# Patient Record
Sex: FEMALE | Race: BLACK OR AFRICAN AMERICAN | Employment: FULL TIME | URBAN - METROPOLITAN AREA
[De-identification: names, ages, dates, MRNs, and addresses within clinical notes are randomized per-mention and may not be internally consistent; named-entity substitution may affect disease eponyms.]

---

## 2021-08-24 ENCOUNTER — APPOINTMENT (OUTPATIENT)
Dept: GENERAL RADIOLOGY | Age: 41
End: 2021-08-24
Attending: PHYSICIAN ASSISTANT
Payer: OTHER GOVERNMENT

## 2021-08-24 ENCOUNTER — HOSPITAL ENCOUNTER (EMERGENCY)
Age: 41
Discharge: HOME | End: 2021-08-24
Attending: STUDENT IN AN ORGANIZED HEALTH CARE EDUCATION/TRAINING PROGRAM
Payer: OTHER GOVERNMENT

## 2021-08-24 VITALS
RESPIRATION RATE: 20 BRPM | SYSTOLIC BLOOD PRESSURE: 122 MMHG | TEMPERATURE: 98.3 F | BODY MASS INDEX: 41.95 KG/M2 | DIASTOLIC BLOOD PRESSURE: 81 MMHG | WEIGHT: 293 LBS | OXYGEN SATURATION: 98 % | HEART RATE: 115 BPM | HEIGHT: 70 IN

## 2021-08-24 DIAGNOSIS — U07.1 COVID-19: Primary | ICD-10-CM

## 2021-08-24 PROCEDURE — 71046 X-RAY EXAM CHEST 2 VIEWS: CPT

## 2021-08-24 PROCEDURE — 74011250636 HC RX REV CODE- 250/636: Performed by: STUDENT IN AN ORGANIZED HEALTH CARE EDUCATION/TRAINING PROGRAM

## 2021-08-24 PROCEDURE — 99282 EMERGENCY DEPT VISIT SF MDM: CPT

## 2021-08-24 RX ORDER — DEXAMETHASONE 4 MG/1
10 TABLET ORAL EVERY 12 HOURS
Status: DISCONTINUED | OUTPATIENT
Start: 2021-08-24 | End: 2021-08-24 | Stop reason: HOSPADM

## 2021-08-24 RX ADMIN — DEXAMETHASONE 10 MG: 4 TABLET ORAL at 14:46

## 2021-08-24 NOTE — Clinical Note
[facility]  [facility] EMERGENCY DEPT  [id number] [address] [phone]    Work/School Note    Date: 8/24/2021     To Whom It May concern:    Desiree Espinoza was evaulated by the following provider(s):  Attending Provider: Lucas Mason MD.   COVID19 virus is suspected. Per the CDC guidelines we recommend home isolation until the following conditions are all met:    1. At least 10 days have passed since symptoms first appeared and  2. At least 24 hours have passed since last fever without the use of fever-reducing medications and  3.  Symptoms (e.g., cough, shortness of breath) have improved    Sincerely,          Ifeanyi Knight MD

## 2021-08-24 NOTE — ED TRIAGE NOTES
Patient reports tested positive for covid on 08/16. Has been seen at [facility]. Wants a second opinion and evaluation regarding her care.

## 2021-08-25 ENCOUNTER — PATIENT OUTREACH (OUTPATIENT)
Dept: CASE MANAGEMENT | Age: 41
End: 2021-08-25

## 2021-08-25 ENCOUNTER — TELEPHONE (OUTPATIENT)
Dept: FAMILY MEDICINE CLINIC | Facility: CLINIC | Age: 41
End: 2021-08-25

## 2021-08-25 NOTE — PROGRESS NOTES
Patient contacted regarding COVID-19 diagnosis. Discussed COVID-19 related testing which was available at this time. Test results were positive. Patient informed of results, if available? Patient was informed of test results x3. Urgent Care on 21 was positive. [facility] ED on 21 was positive and [facility] ED on 21 was positive. Ambulatory Care Manager contacted the patient by telephone to perform post discharge assessment. Call within 2 business days of discharge: Yes Verified name and  with patient as identifiers. Provided introduction to self, and explanation of the CTN/ACM role, and reason for call due to risk factors for infection and/or exposure to COVID-19. Symptoms reviewed with patient who verbalized the following symptoms: cough, shortness of breath and nasal congestion      Due to no new or worsening symptoms encounter was routed to provider for escalation. Discussed follow-up appointments. If no appointment was previously scheduled, appointment scheduling offered:  Yes. Patient does not have a PCP and is without insurance at this time. Is agreeable to being seen by [facility] physician. Ross Key RN with [facility] contacted. [facility] follow up appointment(s): No future appointments. Non-[facility] follow up appointment(s): NA    Interventions to address risk factors: Obtained and reviewed discharge summary and/or continuity of care documents     Advance Care Planning:   Does patient have an Advance Directive: not on file. Educated patient about risk for severe COVID-19 due to risk factors according to CDC guidelines. ACM reviewed discharge instructions, medical action plan and red flag symptoms with the patient who verbalized understanding. Discussed COVID vaccination status: yes. Education provided on COVID-19 vaccination as appropriate. Discussed exposure protocols and quarantine with CDC Guidelines.  Patient was given an opportunity to verbalize any questions and concerns and agrees to contact ACM or health care provider for questions related to their healthcare. Reviewed and educated patient on any new and changed medications related to discharge diagnosis  No medications ordered. Was patient discharged with a pulse oximeter? no Discussed and confirmed pulse oximeter discharge instructions and when to notify provider or seek emergency care. Patient has not been vaccinated but plans to be vaccinated after recovery from COVID-19. Reports that she lives with boyfriend and he also has tested positive, both are self quarantined. ACM provided contact information. Plan to follow up in 14 days based on severity of symptoms and risk factors.

## 2021-08-25 NOTE — TELEPHONE ENCOUNTER
Attempted to reach out to patient to scheduled an telemedicine visit. Left patient a voicemail explaining the services the care [facility] offer and to give the office a call back.

## 2021-08-26 ENCOUNTER — VIRTUAL VISIT (OUTPATIENT)
Dept: FAMILY MEDICINE CLINIC | Facility: CLINIC | Age: 41
End: 2021-08-26

## 2021-08-26 DIAGNOSIS — J12.82 PNEUMONIA DUE TO COVID-19 VIRUS: Primary | ICD-10-CM

## 2021-08-26 DIAGNOSIS — U07.1 PNEUMONIA DUE TO COVID-19 VIRUS: Primary | ICD-10-CM

## 2021-08-26 PROCEDURE — 99441 PR PHYS/QHP TELEPHONE EVALUATION 5-10 MIN: CPT | Performed by: FAMILY MEDICINE

## 2021-08-26 NOTE — ED PROVIDER NOTES
HPI   Patient is a 38-year female who presents emergency department requesting a second opinion regarding her COVID-19 diagnosis. Patient states that she has been having symptoms since the 14th of this month. She was diagnosed with Covid on Monday after she noted progressive worsening cough and shortness of breath. While the hospital she had blood work done and then was sent to infusion clinic to get \"antibiotics \". She states that she was supposed to have this appointment today however when she arrived they told her that she is been having symptoms too long that she was not a candidate. This upset her and she would like something to help treat her Covid infection. States that currently she is still having shortness of breath. This is exertional in nature. It is associated with a nonproductive cough. She denies any associated fever, sweats or chills, runny nose or sore throat. She is not having any nausea vomiting or diarrhea. She denies any associated chest pain. Her breathing is worse with exertion. Improves with rest.  She denies any history of asthma or other lung issues. She does not feel like her symptoms have worsened. No past medical history on file. No past surgical history on file. No family history on file.     Social History     Socioeconomic History    Marital status: SINGLE     Spouse name: Not on file    Number of children: Not on file    Years of education: Not on file    Highest education level: Not on file   Occupational History    Not on file   Tobacco Use    Smoking status: Not on file   Substance and Sexual Activity    Alcohol use: Not on file    Drug use: Not on file    Sexual activity: Not on file   Other Topics Concern    Not on file   Social History Narrative    Not on file     Social Determinants of Health     Financial Resource Strain:     Difficulty of Paying Living Expenses:    Food Insecurity:     Worried About Running Out of Food in the Last Year:  Ran Out of Food in the Last Year:    Transportation Needs:     Lack of Transportation (Medical):  Lack of Transportation (Non-Medical):    Physical Activity:     Days of Exercise per Week:     Minutes of Exercise per Session:    Stress:     Feeling of Stress :    Social Connections:     Frequency of Communication with Friends and Family:     Frequency of Social Gatherings with Friends and Family:     Attends Mosque Services:     Active Member of Clubs or Organizations:     Attends Club or Organization Meetings:     Marital Status:    Intimate Partner Violence:     Fear of Current or Ex-Partner:     Emotionally Abused:     Physically Abused:     Sexually Abused: ALLERGIES: Patient has no known allergies. Review of Systems  Constitutional: No fever  HENT: No ear pain  Eyes: No change in vision  Respiratory: Positive shortness of breath  Cardio: No chest pain  GI: No blood in stool  : No hematuria  MSK: No back pain  Skin: No rashes  Neuro: No headache    Vitals:    08/24/21 1223   BP: 122/81   Pulse: (!) 115   Resp: 20   Temp: 98.3 °F (36.8 °C)   SpO2: 98%   Weight: 137.4 kg (303 lb)   Height: 5' 10\" (1.778 m)            Physical Exam   General: No acute distress  Head: Normocephalic, atraumatic  Psych: Cooperative and alert  Eyes: No scleral icterus, normal conjunctiva  ENT: Moist oral mucosa  Neck: Supple  CV: Tachycardic with regular rhythm, no pitting edema, palpable radial pulses  Pulm: Clear breath sounds bilaterally without any wheezing or rhonchi, normal respiratory rate  GI: Normal bowel sounds, soft, non-tender  MSK: Moves all four extremities  Skin: No rashes  Neuro: Alert and conversive    MDM   Patient is a 21-year-old female who presents with request for second opinion for COVID-19. Patient is noted to be tachycardic but is saturating appropriately. She does get dyspneic with exertion but this slowly resolves.   I did a obtain a ambulatory pulse ox on her which did dip down to approximately 94-95% but never got any lower and quickly improved with rest.  Review of the EMR shows that she had a full work-up done yesterday at the outside hospital including EKG, troponin and D-dimer. Her D-dimer at that time was 0.4. She has not had any new or worsening symptoms since that time. She does state that she was told that she needs antibiotics but did not get them because this I would like to repeat a chest x-ray as I cannot see the one from her outside hospital to evaluate for any signs of overlying bacterial pneumonia although my suspicion for this is low. Patient will be given oral hydration for her tachycardia. Chest x-ray shows patchy groundglass densities throughout lung fields consistent with multifocal pneumonia. This is consistent with her diagnosis of COVID-19 pneumonia. She does not have any obvious thick patches concerning for bacterial pneumonia. Upon reexamination discussed the findings of her chest x-ray with the patient. Since she has not been given any steroids and is short of breath I do feel that she would benefit from dexamethasone so she will be given a dose of this here. I did discuss with her I do not think antibiotics are helpful for her at this time and since she is on day 10 of symptoms neither would monoclonal antibody infusion which is likely the infusion that she was supposed to get today. She is comfortable this plan. I did offer to repeat blood work however since this was done yesterday and she does not have any change in her symptoms I do not think that this is high yield. She is comfortable to plan for no blood work at this time. We did discuss the importance of monitoring her difficulty breathing and if it worsens she needs to immediately return to the emergency department. Patient stable for discharge at this time. Patient is in agreement with the plan to be discharged at this time. All the patient's questions were answered. Patient was given written instructions on the diagnosis, and states understanding of the plan moving forward. We did discuss important signs and symptoms that should prompt quick return to the emergency department. Disposition: Patient was discharged home in stable condition.   They will follow up with their primary care physician    Prescriptions: None    Diagnosis: Acute COVID-19 pneumonia    Procedures

## 2021-08-26 NOTE — PROGRESS NOTES
MADELINE Casillas is a 46 y.o. female being seen today for No chief complaint on file. Natalia Lane She woke up 8/14 with symptoms of fatigue and aches. The next day she felt worse and had temp of 101. Went to urgent care and dx with covid. Shortness of breath did not come till a week or so later. Went to the ER, still testing + for covid and cxr showed pneumonia. She did get a one time dose of dexamethasone. Today the shortness of breath is better. She has not had a fever for days. Otherwise healthy. No surgeries or hospitalzations. Telephone visit due to covid precautions. No past medical history on file. ROS  Patient states that she is feeling well. Denies complaints of chest pain,, swelling of legs, dizziness or weakness. she denies nausea, vomiting or diarrhea. No current outpatient medications on file. No current facility-administered medications for this visit. PE  There were no vitals taken for this visit. Alert and oriented with normal mood and affect. Assessment and Plan:        ICD-10-CM ICD-9-CM    1. Pneumonia due to COVID-19 virus  U07.1 480.8     J12.82 079.89      Pt improving symptomatically. She is 10 days out from her first + test today. Advised she should continue to get better and better. If feeling worse she will call the office. Follow up for WWE at her convenience    Pursuant to the emergency declaration under the [facility], [address] [place] [address] and the [employer], this Virtual  Visit was conducted, with patient's consent, to reduce the patient's risk of exposure to COVID-19 and provide continuity of care for an established patient.     Telephone call 10 minutes      Jevon Kirby MD

## 2021-08-27 ENCOUNTER — PATIENT OUTREACH (OUTPATIENT)
Dept: CASE MANAGEMENT | Age: 41
End: 2021-08-27

## 2021-08-27 NOTE — PROGRESS NOTES
Social Work Note  2021      Date of referral: 2021  Referral received from: Bert Toney RN   Reason for referral: Assist the patient with obtaining financial assistance. MSW received referral from [facility] to assist the patient with community resources to obtain financial assistance. MSW contacted the patient who verified her name and  as identifiers. MSW introduced self, role and reason for call. The patient reported that due to positive COVID results, she has not been able to work and does not have benefits of receiving payments while she is out. She indicated that she is feeling a little better today than she did in past days. MSW explored the option of unemployment and the patient indicated that she attempted to contact them and also attempted to complete unemployment form online but have not been successful with getting through. MSW encouraged the patient to continue attempts to reach assistance at the unemployment office. MSW will attempt to contact the agency for information on behalf of the patient. The patient verbalized understanding. MSW provided the patient with contact number to [employer] [facility] [employer] [place] [employer] [place] for financial assistance. The patient expressed her gratitude for the information.

## 2021-09-13 ENCOUNTER — PATIENT OUTREACH (OUTPATIENT)
Dept: CASE MANAGEMENT | Age: 41
End: 2021-09-13

## 2021-09-13 NOTE — PROGRESS NOTES
Patient resolved from [address] Transitions episode on 9/13/21. Patient contacted regarding COVID-19 diagnosis. Discussed COVID-19 related testing which was available at this time. Test results were positive. Patient informed of results, if available? Patient was informed of test results x3. Urgent Care on 8/16/21 was positive. [facility] ED on 8/23/21 was positive and [facility] ED on 8/24/21 was positive. Patient/family has been provided the following resources and education related to COVID-19:                         Signs, symptoms and red flags related to COVID-19            CDC exposure and quarantine guidelines            [employer] exposure contact - [phone]            Contact for their local Department of Health                 Patient currently reports that the following symptoms have improved:  no new symptoms and no worsening symptoms. No further outreach scheduled with this CTN/ACM/LPN/HC/ MA. Episode of Care resolved. Patient has this CTN/ACM/LPN/HC/MA contact information if future needs arise.

## 2021-10-13 ENCOUNTER — PATIENT OUTREACH (OUTPATIENT)
Dept: CASE MANAGEMENT | Age: 41
End: 2021-10-13

## 2021-10-13 NOTE — PROGRESS NOTES
Social Work Note  10/13/2021    Date of referral: 8/25/2021  Referral received from: ASHKAN- Aldo Nolan RN          Reason for referral: Assist the patient with obtaining financial assistance.     MSW received referral from [facility] to assist the patient with community resources to obtain financial assistance.      MSW attempted to reach the patient for follow up. Left message with information to return call. MSW will follow to assist the patient.

## 2021-10-20 ENCOUNTER — PATIENT OUTREACH (OUTPATIENT)
Dept: CASE MANAGEMENT | Age: 41
End: 2021-10-20

## 2021-10-20 NOTE — PROGRESS NOTES
Social Work Note  10/20/2021    Date of referral: 8/25/2021  Referral received from: Niall Ayers RN          Reason for referral: Assist the patient with obtaining financial assistance.     MSW received referral from [facility] to assist the patient with community resources to obtain financial assistance. MSW attempted to reach the patient for follow up.      Left message with information to return call.      2 attempts have been made to reach the patient with no return call. Episode will be closed. No further follow up will be required at this time.      MSW will be available to assist with any future needs.